# Patient Record
Sex: MALE | Race: WHITE | NOT HISPANIC OR LATINO
[De-identification: names, ages, dates, MRNs, and addresses within clinical notes are randomized per-mention and may not be internally consistent; named-entity substitution may affect disease eponyms.]

---

## 2018-11-21 PROBLEM — Z00.00 ENCOUNTER FOR PREVENTIVE HEALTH EXAMINATION: Status: ACTIVE | Noted: 2018-11-21

## 2018-12-21 ENCOUNTER — LABORATORY RESULT (OUTPATIENT)
Age: 48
End: 2018-12-21

## 2018-12-21 ENCOUNTER — APPOINTMENT (OUTPATIENT)
Dept: ENDOCRINOLOGY | Facility: CLINIC | Age: 48
End: 2018-12-21
Payer: COMMERCIAL

## 2018-12-21 VITALS
WEIGHT: 190 LBS | SYSTOLIC BLOOD PRESSURE: 120 MMHG | DIASTOLIC BLOOD PRESSURE: 82 MMHG | HEART RATE: 74 BPM | BODY MASS INDEX: 25.73 KG/M2 | HEIGHT: 72 IN

## 2018-12-21 DIAGNOSIS — Z78.9 OTHER SPECIFIED HEALTH STATUS: ICD-10-CM

## 2018-12-21 DIAGNOSIS — Z86.39 PERSONAL HISTORY OF OTHER ENDOCRINE, NUTRITIONAL AND METABOLIC DISEASE: ICD-10-CM

## 2018-12-21 DIAGNOSIS — R74.0 NONSPECIFIC ELEVATION OF LEVELS OF TRANSAMINASE AND LACTIC ACID DEHYDROGENASE [LDH]: ICD-10-CM

## 2018-12-21 PROCEDURE — 99213 OFFICE O/P EST LOW 20 MIN: CPT | Mod: 25

## 2018-12-21 PROCEDURE — 36415 COLL VENOUS BLD VENIPUNCTURE: CPT

## 2018-12-23 LAB
ANION GAP SERPL CALC-SCNC: 12 MMOL/L
BUN SERPL-MCNC: 15 MG/DL
CALCIUM SERPL-MCNC: 9.6 MG/DL
CHLORIDE SERPL-SCNC: 105 MMOL/L
CO2 SERPL-SCNC: 21 MMOL/L
CREAT SERPL-MCNC: 1.09 MG/DL
GLUCOSE SERPL-MCNC: 91 MG/DL
POTASSIUM SERPL-SCNC: 4.2 MMOL/L
SODIUM SERPL-SCNC: 138 MMOL/L
T3 SERPL-MCNC: 132 NG/DL
T3RU NFR SERPL: 0.99 INDEX
T4 SERPL-MCNC: 10.1 UG/DL
TSH SERPL-ACNC: 1.47 UIU/ML

## 2019-01-04 ENCOUNTER — TRANSCRIPTION ENCOUNTER (OUTPATIENT)
Age: 49
End: 2019-01-04

## 2019-03-08 ENCOUNTER — MEDICATION RENEWAL (OUTPATIENT)
Age: 49
End: 2019-03-08

## 2019-07-12 ENCOUNTER — APPOINTMENT (OUTPATIENT)
Dept: ENDOCRINOLOGY | Facility: CLINIC | Age: 49
End: 2019-07-12
Payer: COMMERCIAL

## 2019-07-12 VITALS
HEART RATE: 63 BPM | SYSTOLIC BLOOD PRESSURE: 120 MMHG | BODY MASS INDEX: 25.6 KG/M2 | DIASTOLIC BLOOD PRESSURE: 80 MMHG | HEIGHT: 72 IN | WEIGHT: 189 LBS

## 2019-07-12 PROCEDURE — 99213 OFFICE O/P EST LOW 20 MIN: CPT | Mod: 25

## 2019-07-12 PROCEDURE — 36415 COLL VENOUS BLD VENIPUNCTURE: CPT

## 2019-07-13 LAB
ANION GAP SERPL CALC-SCNC: 15 MMOL/L
BUN SERPL-MCNC: 20 MG/DL
CALCIUM SERPL-MCNC: 9.7 MG/DL
CHLORIDE SERPL-SCNC: 105 MMOL/L
CO2 SERPL-SCNC: 22 MMOL/L
CREAT SERPL-MCNC: 0.98 MG/DL
GLUCOSE SERPL-MCNC: 84 MG/DL
POTASSIUM SERPL-SCNC: 4.1 MMOL/L
SODIUM SERPL-SCNC: 142 MMOL/L
T3 SERPL-MCNC: 112 NG/DL
T4 SERPL-MCNC: 8.7 UG/DL
TSH SERPL-ACNC: 1.36 UIU/ML

## 2019-07-13 NOTE — PHYSICAL EXAM
[Alert] : alert [No Acute Distress] : no acute distress [Well Nourished] : well nourished [Well Developed] : well developed [PERRL] : pupils equal, round and reactive to light [Healthy Appearance] : healthy appearance [Normal Voice/Communication] : normal voice communication [No Proptosis] : no proptosis [Thyroid Not Enlarged] : the thyroid was not enlarged [Normal Outer Ear/Nose] : the ears and nose were normal in appearance [No Respiratory Distress] : no respiratory distress [No Stigmata of Cushings Syndrome] : no stigmata of cushings syndrome [Normal Insight/Judgement] : insight and judgment were intact [Oriented x3] : oriented to person, place, and time [Normal Rate and Effort] : normal respiratory rhythm and effort [Normal Mood] : the mood was normal [Normal Affect] : the affect was normal

## 2019-07-13 NOTE — HISTORY OF PRESENT ILLNESS
[FreeTextEntry1] : July 12, 2019\par  PCP: Dr. Tenisha Madrigal [facility]\par  phone: [phone] Fax: [phone]\par  Eyes: Dr. Bass\par  Derm: Dr. Monteiro - hair loss patch under chin\par  ENT at [facility]: [facility] (did rhinoplasty)\par  .\par  CC: Hypothyroid s/p I-131 2009 for Graves' Dis\par  2013: u/s thyroid - atrophic gland\par  alopecia areata beard area\par \par He remains on levothyroxine 112 mcg x 7 1/2 tabs a week.\par He feels well.\par \par Impression:  Hypothyroid post I-131 for hyperthyroidism due to Graves' disease - well replaced.\par Plan:  Same Rx  - stagger visits here roughtly by 6 months to his PCP.  \par \par \par \par December 21, 2018\par \par  PCP: Dr. Tenisha Madrigal [facility]\par  phone: [phone] Fax: [phone]\par  Eyes: Dr. Bass\par  Derm: Dr. Monteiro - hair loss patch under chin\par  ENT at [facility]: [facility] (did rhinoplasty)\par  .\par  CC: Hypothyroid s/p I-131 2009 for Graves' Dis\par  2013: u/s thyroid - atrophic gland\par  alopecia areata beard area\par \par Currently taking levothyroxine 112 mcg x 7 1/2 tabs a week and feels well.\par \par Plan:   TFTs today and ROV 6 months.  \par \par \par \par \par Previous notes from [facility] appended below.\par \par \par  July 20, 2018\par            .\par            PCP: Dr. Tenisha Madrigal [facility]\par             phone: [phone] Fax: [phone]\par             Eyes: Dr. Bass\par             Derm: Dr. Monteiro - hair loss patch under chin\par             ENT at [facility]: [facility] (did rhinoplasty)\par            .\par            CC: Hypothyroid s/p I-131 2009 for Graves' Dis\par             2013: u/s thyroid - atrophic gland\par             alopecia areata beard area\par            .\par            Remains on 7 1/2 tabs of LT4 112 mcg daily. \par            .\par            Has had some fluctuations of TSH.\par            January 12 at [facility] 7.38 and\par            Jan 22 at [facility] TSH 3.45\par            .\par            Subsequent GARCIA.\par            Has followed up to Dr. 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 \par            Does not appear that TFTs are related to HA. \par            .\par            Plan: Updated labs today and ROV end of November.\par            .\par            ==\par            .\par            January 22, 2018\par            .\par            .\par            PCP: Dr. Tenisha Madrigal [facility]\par             phone: [phone] Fax: [phone]\par             Eyes: Dr. Bass\par             Derm: Dr. Monteiro - hair loss patch under chin\par            .\par            CC: Hypothyroid s/p I-131 2009 for Graves' Dis\par             2013: u/s thyroid - atrophic gland\par            .\par            Recent labs kindly provided by Dr. Tenisha Mane from \par            1/12/2018 included\par            TSH 7.88\par            .\par            Notes 2 BM/d\par            1 + urine \par            .\par            Imp: Taking L-T4 112 x 7 1/2 tabs a week = 119\par            .\par            Plan: Updated labs\par            May need 125\par            ROV 4 months\par            .\par            ==\par            .\par            June 16, 2017\par            .\par            PCP: Dr. Tenisha Madrigal [facility]\par             phone: [phone] Fax: [phone]\par             Eyes: Dr. Bass\par             Derm: Dr. Monteiro - hair loss patch under chin\par            .\par            CC: Hypothyroid s/p I-131 2009\par             2013: u/s thyroid - atrophic gland\par            .\par            Taking levothyroxine 112 mcg x 7 1/2 tabs a week. (~120 mcg avg)\par            Feels well.\par            No symptoms\par            .\par            Impression: S/P I131 treatment of hyperthyroidism in 2009, doing well on current dose of levothyroxine \par            .\par            Plan: History reviewed with Mr. Raya.\par            Guidelines reviewed. \par            Updated labs today and ROV March 2018.\par            Call here in 10-14 days.\par            .\par            ==\par            .\par            May 6, 2016\par            .\par            PCP: Dr. Leatha Fischer -> Dr. Tenisha Mane - /[facility]\par             phone: [phone] Fax: [phone]\par             Eyes: Dr. Bass\par            .\par            CC: Hypothyroid s/p I-131 2009\par            .\par            Note from May 2015 appended below.\par            At that time, labs ([facility]) included:\par            .\par            TSH 0.41,  (<140%) on\par            levothyroxine 112 mcg x 7 1/2 pills a week. \par            .\par            1/2009: Hematuria - Dr. Eisenberg - negative U/S.\par            .\par            .\par            11/9/2009: ultrasound abdomen: "No suspicious finding to account for elevated enzymes..." as read by Dr. Long\par            .\par            11/18/2009: I-131 18 mCi to treat hyperthyroidism. after he developed\par            LFT elevation on methimazole.\par            .\par            2/10/2012: Seen at [facility] for chest pain. TSH 3.94\par            .\par            4/12/2013: Ultrasound thyroid: Heterogeneous atrophic thyroid..R lobe 2.8 cm, L lobe 1.9 cm (Dr. Almanza). \par            .\par            5/3/2016 ([facility] via [facility]) included:\par            TSH 1.65 (on levothyroxine 112 mcg x 7 1/2 pills/week)\par            T4 10.8\par            T3 102\par            .\par            Impression: Hypothyroidism due to I131 treatment of Graves' disease after LFT elevation on methimazole currently well controlled on levothyroxine 112 mcg x 7 1/2 pills a week. He feels well. Notes no symptoms. Eyes being followed by Dr. Bass.\par            .\par            Plan: Same Rx. ROV within one year, sooner if needed. \par            .\par            ==\par            .\par            May 15, 2015\par            .\par            PCP: Dr. Leatha Fischer\par            CC: Hypothyroid s/p I-131 2009\par            .\par            Note from last year appended below.\par            Recent TSH within normal limits on levothyroxine 112 mcg\par            7 1/2 pills/week.\par            .\par            Impression: Doing well.\par            .\par            Plan: F/U one year.\par            .\par            ==\par            April 25, 2014\par            PCP: Dr. Leatha Fischer\par            CC: Hypothyroid s/p I-131\par            .\par            2009: I-131\par            2014 TSH normal on L-thyroxine 112 7 1/2 pills a week \par            Impression: Feels well. Previous sonogram thyroid bed unremarkable. TFTs in good range.\par            .\par            Plan: Same Rx.\par            F/U Dr. Fischer\par            .\par            =======\par            October 18, 2013\par            April 19, 2013\par            CC: Hypothryoid s/p I-131 for Graves dis\par            .\par            PCP: Dr. Fischer\par            .\par            Feels well on L-thyroxine 112 mcg daily.\par            TSH still slgihtly elevated, as is TSI\par            Thyroid sonogram shows only atrophic gland.\par            .\par            Will continue same Rx for now, possibly raise dose after next visit in 6 months.

## 2020-03-06 ENCOUNTER — APPOINTMENT (OUTPATIENT)
Dept: ENDOCRINOLOGY | Facility: CLINIC | Age: 50
End: 2020-03-06
Payer: COMMERCIAL

## 2020-03-06 VITALS
HEIGHT: 72 IN | HEART RATE: 67 BPM | SYSTOLIC BLOOD PRESSURE: 122 MMHG | WEIGHT: 187 LBS | BODY MASS INDEX: 25.33 KG/M2 | OXYGEN SATURATION: 98 % | DIASTOLIC BLOOD PRESSURE: 80 MMHG

## 2020-03-06 PROCEDURE — 99214 OFFICE O/P EST MOD 30 MIN: CPT | Mod: 25

## 2020-03-06 PROCEDURE — 36415 COLL VENOUS BLD VENIPUNCTURE: CPT

## 2020-03-07 LAB
T3 SERPL-MCNC: 119 NG/DL
T4 SERPL-MCNC: 9.3 UG/DL
TSH SERPL-ACNC: 2.49 UIU/ML

## 2020-03-07 NOTE — HISTORY OF PRESENT ILLNESS
[FreeTextEntry1] : Mar 06, 2020\par \par PCP: Dr. Tenisha Madrigal [facility]  phone: [phone] Fax: [phone]\par  Eyes: Dr. Bsas\par  Derm: Dr. Monteiro - hair loss patch under chin\par  ENT at [facility]: [facility] (did rhinoplasty)\par  .\par  CC: Hypothyroid s/p I-131 2009 for Graves' Dis\par  2013: u/s thyroid - atrophic gland\par  alopecia areata beard area\par \par 48 yo still taking levothyroxine 112 mcg x 7 1/2 tabs a week for hypothyroidism post I-131 treatment in 2009.\par He feels well.\par Plan:  Updated TFTs today.\par ROV one year.  \par \par July 12, 2019\par  PCP: Dr. Tenisha Madrigal [facility]\par  phone: [phone] Fax: [phone]\par  Eyes: Dr. Bass\par  Derm: Dr. Monteiro - hair loss patch under chin\par  ENT at [facility]: [facility] (did rhinoplasty)\par  .\par  CC: Hypothyroid s/p I-131 2009 for Graves' Dis\par  2013: u/s thyroid - atrophic gland\par  alopecia areata beard area\par \par He remains on levothyroxine 112 mcg x 7 1/2 tabs a week.\par He feels well.\par \par Impression:  Hypothyroid post I-131 for hyperthyroidism due to Graves' disease - well replaced.\par Plan:  Same Rx  - stagger visits here roughtly by 6 months to his PCP.  \par \par \par \par December 21, 2018\par \par  PCP: Dr. Tenisha Madrigal [facility]\par  phone: [phone] Fax: [phone]\par  Eyes: Dr. Bass\par  Derm: Dr. Monteiro - hair loss patch under chin\par  ENT at [facility]: [facility] (did rhinoplasty)\par  .\par  CC: Hypothyroid s/p I-131 2009 for Graves' Dis\par  2013: u/s thyroid - atrophic gland\par  alopecia areata beard area\par \par Currently taking levothyroxine 112 mcg x 7 1/2 tabs a week and feels well.\par \par Plan:   TFTs today and ROV 6 months.  \par \par \par \par \par Previous notes from [facility] [facility] appended below.\par \par \par  July 20, 2018\par            .\par            PCP: Dr. Tenisha Mane - [facility]\par             phone: [phone] Fax: [phone]\par             Eyes: Dr. Bass\par             Derm: Dr. Monteiro - hair loss patch under chin\par             [facility] at [facility] (did [facility]in[facility])\par            .\par            CC: Hypothyroid s/p I-131 2009 for Graves' Dis\par             2013: u/s thyroid - atrophic gland\par             alopecia areata beard area\par            .\par            Remains on 7 1/2 tabs of LT4 112 mcg daily. \par            .\par            Has had some fluctuations of TSH.\par            January 12 at [facility] 7.38 and\par            Jan 22 at [facility] TSH 3.45\par            .\par            Subsequent GARCIA.\par            Has followed up to Dr. 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 \par            Does not appear that TFTs are related to HA. \par            .\par            Plan: Updated labs today and ROV end of November.\par            .\par            ==\par            .\par            January 22, 2018\par            .\par            .\par            PCP: Dr. Tenisha Mane - [facility]\par             phone: [phone] Fax: [phone]\par             Eyes: Dr. Bass\par             Derm: Dr. Monteiro - hair loss patch under chin\par            .\par            CC: Hypothyroid s/p I-131 2009 for Graves' Dis\par             2013: u/s thyroid - atrophic gland\par            .\par            Recent labs kindly provided by Dr. Tenisha Mane from \par            1/12/2018 included\par            TSH 7.88\par            .\par            Notes 2 BM/d\par            1 + urine \par            .\par            Imp: Taking L-T4 112 x 7 1/2 tabs a week = 119\par            .\par            Plan: Updated labs\par            May need 125\par            ROV 4 months\par            .\par            ==\par            .\par            June 16, 2017\par            .\par            PCP: Dr. Tenisha Mane - [facility]\par             phone: [phone] Fax: [phone]\par             Eyes: Dr. Bass\par             Derm: Dr. Monteiro - hair loss patch under chin\par            .\par            CC: Hypothyroid s/p I-131 2009\par             2013: u/s thyroid - atrophic gland\par            .\par            Taking levothyroxine 112 mcg x 7 1/2 tabs a week. (~120 mcg avg)\par            Feels well.\par            No symptoms\par            .\par            Impression: S/P I131 treatment of hyperthyroidism in 2009, doing well on current dose of levothyroxine \par            .\par            Plan: History reviewed with Mr. Raya.\par            Guidelines reviewed. \par            Updated labs today and ROV March 2018.\par            Call here in 10-14 days.\par            .\par            ==\par            .\par            May 6, 2016\par            .\par            PCP: Dr. Leatha Fischer -> Dr. Tenisha Mane - /[facility]\par             phone: [phone] Fax: [phone]\par             Eyes: Dr. Bass\par            .\par            CC: Hypothyroid s/p I-131 2009\par            .\par            Note from May 2015 appended below.\par            At that time, labs ([facility]) included:\par            .\par            TSH 0.41,  (<140%) on\par            levothyroxine 112 mcg x 7 1/2 pills a week. \par            .\par            1/2009: Hematuria - Dr. Eisenberg - negative U/S.\par            .\par            .\par            11/9/2009: ultrasound abdomen: "No suspicious finding to account for elevated enzymes..." as read by Dr. Long\par            .\par            11/18/2009: I-131 18 mCi to treat hyperthyroidism. after he developed\par            LFT elevation on methimazole.\par            .\par            2/10/2012: Seen at [place] ED for chest pain. TSH 3.94\par            .\par            4/12/2013: Ultrasound thyroid: Heterogeneous atrophic thyroid..R lobe 2.8 cm, L lobe 1.9 cm (Dr. Almanza). \par            .\par            5/3/2016 ([facility] via [facility]) included:\par            TSH 1.65 (on levothyroxine 112 mcg x 7 1/2 pills/week)\par            T4 10.8\par            T3 102\par            .\par            Impression: Hypothyroidism due to I131 treatment of Graves' disease after LFT elevation on methimazole currently well controlled on levothyroxine 112 mcg x 7 1/2 pills a week. He feels well. Notes no symptoms. Eyes being followed by Dr. Bass.\par            .\par            Plan: Same Rx. ROV within one year, sooner if needed. \par            .\par            ==\par            .\par            May 15, 2015\par            .\par            PCP: Dr. Leatha Fischer\par            CC: Hypothyroid s/p I-131 2009\par            .\par            Note from last year appended below.\par            Recent TSH within normal limits on levothyroxine 112 mcg\par            7 1/2 pills/week.\par            .\par            Impression: Doing well.\par            .\par            Plan: F/U one year.\par            .\par            ==\par            April 25, 2014\par            PCP: Dr. Leatha Fischer\par            CC: Hypothyroid s/p I-131\par            .\par            2009: I-131\par            2014 TSH normal on L-thyroxine 112 7 1/2 pills a week \par            Impression: Feels well. Previous sonogram thyroid bed unremarkable. TFTs in good range.\par            .\par            Plan: Same Rx.\par            F/U Dr. Fischer\par            .\par            =======\par            October 18, 2013\par            April 19, 2013\par            CC: Hypothryoid s/p I-131 for Graves dis\par            .\par            PCP: Dr. Fischer\par            .\par            Feels well on L-thyroxine 112 mcg daily.\par            TSH still slgihtly elevated, as is TSI\par            Thyroid sonogram shows only atrophic gland.\par            .\par            Will continue same Rx for now, possibly raise dose after next visit in 6 months.

## 2020-03-07 NOTE — PHYSICAL EXAM
[Alert] : alert [No Acute Distress] : no acute distress [Well Nourished] : well nourished [Well Developed] : well developed [Healthy Appearance] : healthy appearance [Normal Voice/Communication] : normal voice communication [No Proptosis] : no proptosis [No Respiratory Distress] : no respiratory distress [Normal Rate and Effort] : normal respiratory rhythm and effort [No Accessory Muscle Use] : no accessory muscle use [Normal Rate] : heart rate was normal  [No Involuntary Movements] : no involuntary movements were seen [No Tremors] : no tremors [Oriented x3] : oriented to person, place, and time [Normal Insight/Judgement] : insight and judgment were intact [Normal Affect] : the affect was normal [Normal Mood] : the mood was normal [de-identified] : Corona Virus season

## 2021-02-05 ENCOUNTER — APPOINTMENT (OUTPATIENT)
Dept: ENDOCRINOLOGY | Facility: CLINIC | Age: 51
End: 2021-02-05
Payer: COMMERCIAL

## 2021-02-05 PROCEDURE — 99213 OFFICE O/P EST LOW 20 MIN: CPT | Mod: 95

## 2021-02-05 NOTE — HISTORY OF PRESENT ILLNESS
[FreeTextEntry1] : Feb 05, 2021    iPhone \par \par PCP: Dr. Tenisha Madrigal [facility]  phone: [phone] Fax: [phone]\par  Eyes: Dr. Bass\par  Derm: Dr. Monteiro - hair loss patch under chin\par  ENT at [facility]: [facility] (did rhinoplasty)\par  .\par  CC: Hypothyroid s/p I-131 2009 for Graves' Dis\par  2013: u/s thyroid - atrophic gland\par  alopecia areata beard area\par  hep\par \par Hypothyroid post I-131 in 2009 on levothryoxine 112 x 7 1/2 tabs a week.\par Feels well.\par \par Plan:  TFTs at [place].  Call me a few days later.\par ROV in 10-11 months\par \par \par \par \par \par Mar 06, 2020\par \par PCP: Dr. Tenisha Madrigal [facility]  phone: [phone] Fax: [phone]\par  Eyes: Dr. Bass\par  Derm: Dr. Monteiro - hair loss patch under chin\par  ENT at [facility]: [facility] (did rhinoplasty)\par  .\par  CC: Hypothyroid s/p I-131 2009 for Graves' Dis\par  2013: u/s thyroid - atrophic gland\par  alopecia areata beard area\par \par 48 yo still taking levothyroxine 112 mcg x 7 1/2 tabs a week for hypothyroidism post I-131 treatment in 2009.\par He feels well.\par Plan:  Updated TFTs today.\par ROV one year.  \par \par July 12, 2019\par  PCP: Dr. Tenisha Madrigal [facility]\par  phone: [phone] Fax: [phone]\par  Eyes: Dr. Bass\par  Derm: Dr. Monteiro - hair loss patch under chin\par  ENT at [facility]: [facility] (did rhinoplasty)\par  .\par  CC: Hypothyroid s/p I-131 2009 for Graves' Dis\par  2013: u/s thyroid - atrophic gland\par  alopecia areata beard area\par \par He remains on levothyroxine 112 mcg x 7 1/2 tabs a week.\par He feels well.\par \par Impression:  Hypothyroid post I-131 for hyperthyroidism due to Graves' disease - well replaced.\par Plan:  Same Rx  - stagger visits here roughtly by 6 months to his PCP.  \par \par \par \par December 21, 2018\par \par  PCP: Dr. Tenisha Madrigal [facility]\par  phone: [phone] Fax: [phone]\par  Eyes: Dr. Bass\par  Derm: Dr. Monteiro - hair loss patch under chin\par  ENT at [facility]: [facility] (did rhinoplasty)\par  .\par  CC: Hypothyroid s/p I-131 2009 for Graves' Dis\par  2013: u/s thyroid - atrophic gland\par  alopecia areata beard area\par \par Currently taking levothyroxine 112 mcg x 7 1/2 tabs a week and feels well.\par \par Plan:   TFTs today and ROV 6 months.  \par \par \par \par \par Previous notes from [employer] appended below.\par \par \par  July 20, 2018\par            .\par            PCP: Dr. Tenisha Madrigal [facility]\par             phone: [phone] Fax: [phone]\par             Eyes: Dr. Bass\par             Derm: Dr. Monteiro - hair loss patch under chin\par             ENT at [facility]: [facility] (did rhinoplasty)\par            .\par            CC: Hypothyroid s/p I-131 2009 for Graves' Dis\par             2013: u/s thyroid - atrophic gland\par             alopecia areata beard area\par            .\par            Remains on 7 1/2 tabs of LT4 112 mcg daily. \par            .\par            Has had some fluctuations of TSH.\par            January 12 at [facility] 7.38 and\par            Jan 22 at [facility] TSH 3.45\par            .\par            Subsequent GARCIA.\par            Has followed up to Dr. 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 \par            Does not appear that TFTs are related to HA. \par            .\par            Plan: Updated labs today and ROV end of November.\par            .\par            ==\par            .\par            January 22, 2018\par            .\par            .\par            PCP: Dr. Tenisha Madrigal [facility]\par             phone: [phone] Fax: [phone]\par             Eyes: Dr. Bass\par             Derm: Dr. Monteiro - hair loss patch under chin\par            .\par            CC: Hypothyroid s/p I-131 2009 for Graves' Dis\par             2013: u/s thyroid - atrophic gland\par            .\par            Recent labs kindly provided by Dr. Tenisha Mane from \par            1/12/2018 included\par            TSH 7.88\par            .\par            Notes 2 BM/d\par            1 + urine \par            .\par            Imp: Taking L-T4 112 x 7 1/2 tabs a week = 119\par            .\par            Plan: Updated labs\par            May need 125\par            ROV 4 months\par            .\par            ==\par            .\par            June 16, 2017\par            .\par            PCP: Dr. Tenisha Madrigal [facility]\par             phone: [phone] Fax: [phone]\par             Eyes: Dr. Bass\par             Derm: Dr. Thania Madrigal hair loss patch under chin\par            .\par            CC: Hypothyroid s/p I-131 2009\par             2013: u/s thyroid - atrophic gland\par            .\par            Taking levothyroxine 112 mcg x 7 1/2 tabs a week. (~120 mcg avg)\par            Feels well.\par            No symptoms\par            .\par            Impression: S/P I131 treatment of hyperthyroidism in 2009, doing well on current dose of levothyroxine \par            .\par            Plan: History reviewed with Mr. Raya.\par            Guidelines reviewed. \par            Updated labs today and ROV March 2018.\par            Call here in 10-14 days.\par            .\par            ==\par            .\par            May 6, 2016\par            .\par            PCP: Dr. Leatha Fischer -> Dr. Tenisha Mane - [facility]\par             phone: [phone] Fax: [phone]\par             Eyes: Dr. Bass\par            .\par            CC: Hypothyroid s/p I-131 2009\par            .\par            Note from May 2015 appended below.\par            At that time, labs ([facility]) included:\par            .\par            TSH 0.41,  (<140%) on\par            levothyroxine 112 mcg x 7 1/2 pills a week. \par            .\par            1/2009: Hematuria - Dr. Eisenberg - negative U/S.\par            .\par            .\par            11/9/2009: ultrasound abdomen: "No suspicious finding to account for elevated enzymes..." as read by Dr. Long\par            .\par            11/18/2009: I-131 18 mCi to treat hyperthyroidism. after he developed\par            LFT elevation on methimazole.\par            .\par            2/10/2012: Seen at [facility] [place] ED for chest pain. TSH 3.94\par            .\par            4/12/2013: Ultrasound thyroid: Heterogeneous atrophic thyroid..R lobe 2.8 cm, L lobe 1.9 cm (Dr. Almanza). \par            .\par            5/3/2016 ([facility] via [facility]) included:\par            TSH 1.65 (on levothyroxine 112 mcg x 7 1/2 pills/week)\par            T4 10.8\par            T3 102\par            .\par            Impression: Hypothyroidism due to I131 treatment of Graves' disease after LFT elevation on methimazole currently well controlled on levothyroxine 112 mcg x 7 1/2 pills a week. He feels well. Notes no symptoms. Eyes being followed by Dr. Bass.\par            .\par            Plan: Same Rx. ROV within one year, sooner if needed. \par            .\par            ==\par            .\par            May 15, 2015\par            .\par            PCP: Dr. Leatha Fischer\par            CC: Hypothyroid s/p I-131 2009\par            .\par            Note from last year appended below.\par            Recent TSH within normal limits on levothyroxine 112 mcg\par            7 1/2 pills/week.\par            .\par            Impression: Doing well.\par            .\par            Plan: F/U one year.\par            .\par            ==\par            April 25, 2014\par            PCP: Dr. Leatha Fischer\par            CC: Hypothyroid s/p I-131\par            .\par            2009: I-131\par            2014 TSH normal on L-thyroxine 112 7 1/2 pills a week \par            Impression: Feels well. Previous sonogram thyroid bed unremarkable. TFTs in good range.\par            .\par            Plan: Same Rx.\par            F/U Dr. Fischer\par            .\par            =======\par            October 18, 2013\par            April 19, 2013\par            CC: Hypothryoid s/p I-131 for Graves dis\par            .\par            PCP: Dr. Fischer\par            .\par            Feels well on L-thyroxine 112 mcg daily.\par            TSH still slgihtly elevated, as is TSI\par            Thyroid sonogram shows only atrophic gland.\par            .\par            Will continue same Rx for now, possibly raise dose after next visit in 6 months.

## 2022-02-21 DIAGNOSIS — R53.83 OTHER FATIGUE: ICD-10-CM

## 2022-02-21 DIAGNOSIS — E78.00 PURE HYPERCHOLESTEROLEMIA, UNSPECIFIED: ICD-10-CM

## 2022-04-29 ENCOUNTER — NON-APPOINTMENT (OUTPATIENT)
Age: 52
End: 2022-04-29

## 2022-04-29 ENCOUNTER — APPOINTMENT (OUTPATIENT)
Dept: ENDOCRINOLOGY | Facility: CLINIC | Age: 52
End: 2022-04-29
Payer: COMMERCIAL

## 2022-04-29 DIAGNOSIS — E53.8 DEFICIENCY OF OTHER SPECIFIED B GROUP VITAMINS: ICD-10-CM

## 2022-04-29 PROCEDURE — 99214 OFFICE O/P EST MOD 30 MIN: CPT | Mod: 25

## 2022-04-29 PROCEDURE — 36415 COLL VENOUS BLD VENIPUNCTURE: CPT

## 2022-04-29 NOTE — HISTORY OF PRESENT ILLNESS
[FreeTextEntry1] : Apr 29, 2022                               asymtomatic Covid 19 infection \par \par PCP: Dr. Tenisha Madrigal [facility]  phone: [phone] Fax: [phone]\par  Eyes: Dr. Bass     in a few weeks  \par  Derm: Dr. Monteiro - hair loss patch under chin\par  ENT at [facility] (did rh[facility]oplasty)\par  .\par  CC: Hypothyroid s/p I-131 2009 for Graves' Dis\par  2013: u/s thyroid - atrophic gland\par  alopecia areata beard area - also on scalp, treated by Dr. Monteiro  \par  hep\par \par Hypothyroid post I-131 in 2009 on levothryoxine 112 x 7 1/2 tabs a week.\par Feels well.\par \par 50 yo visits for hypothyroidism post I-131 for hyperthyroidism due to Grave's disease in 2009\par \par : :\par Constitutional:  Alert, well nourished, healthy appearance, no acute distress \par Eyes:  No proptosis, no stare\par Thyroid: minimal tissue\par Pulmonary:  No respiratory distress, no accessory muscle use; normal rate and effort\par Cardiac:  Normal rate\par Vascular: \par Endocrine:  No stigmata of Cushing’s Syndrome\par Musculoskeletal:  Normal gait, no involuntary movements\par Neurology:  No tremors\par Affect/Mood/Psych:  Oriented x 3; normal affect, normal insight/judgement, normal mood \par .\par \par Impression:  Post I-131 hypoothyroidism has been well controleld on levothyroxine 112 x 7 1/2 tabs a week.\par \par Plan:  He will follow up to the other members of his team.\par \par TFTs today and ROV in 8 months.  \par \par \par \par Feb 05, 2021    iPhone \par \par PCP: Dr. Tenisha Madrigal /[place]  phone: [phone] Fax: [phone]\par  Eyes: Dr. Bass\par  Derm: Dr. Monteiro - hair loss patch under chin\par  ENT at [facility]: [facility] (did rhinoplasty)\par  .\par  CC: Hypothyroid s/p I-131 2009 for Graves' Dis\par  2013: u/s thyroid - atrophic gland\par  alopecia areata beard area\par  hep\par \par Hypothyroid post I-131 in 2009 on levothryoxine 112 x 7 1/2 tabs a week.\par Feels well.\par \par Plan:  TFTs at [place].  Call me a few days later.\par ROV in 10-11 months\par \par \par \par \par \par Mar 06, 2020\par \par PCP: Dr. Tenisha Mane - /[place]  phone: [phone] Fax: [phone]\par  Eyes: Dr. Bass\par  Derm: Dr. Monteiro - hair loss patch under chin\par  ENT at [facility]: [facility] (did rhinoplasty)\par  .\par  CC: Hypothyroid s/p I-131 2009 for Graves' Dis\par  2013: u/s thyroid - atrophic gland\par  alopecia areata beard area\par \par 50 yo still taking levothyroxine 112 mcg x 7 1/2 tabs a week for hypothyroidism post I-131 treatment in 2009.\par He feels well.\par Plan:  Updated TFTs today.\par ROV one year.  \par \par July 12, 2019\par  PCP: Dr. Tenisha Mane - /[place]\par  phone: [phone] Fax: [phone]\par  Eyes: Dr. Bass\par  Derm: Dr. Monteiro - hair loss patch under chin\par  ENT at [facility]: [facility] (did rhinoplasty)\par  .\par  CC: Hypothyroid s/p I-131 2009 for Graves' Dis\par  2013: u/s thyroid - atrophic gland\par  alopecia areata beard area\par \par He remains on levothyroxine 112 mcg x 7 1/2 tabs a week.\par He feels well.\par \par Impression:  Hypothyroid post I-131 for hyperthyroidism due to Graves' disease - well replaced.\par Plan:  Same Rx  - stagger visits here roughtly by 6 months to his PCP.  \par \par \par \par December 21, 2018\par \par  PCP: Dr. Tenisha Madrigal [facility]\par  phone: [phone] Fax: [phone]\par  Eyes: Dr. Bass\par  Derm: Dr. Monteiro - hair loss patch under chin\par  ENT at [facility] (did rhinoplasty)\par  .\par  CC: Hypothyroid s/p I-131 2009 for Graves' Dis\par  2013: u/s thyroid - atrophic gland\par  alopecia areata beard area\par \par Currently taking levothyroxine 112 mcg x 7 1/2 tabs a week and feels well.\par \par Plan:   TFTs today and ROV 6 months.  \par \par \par \par \par Previous notes from [employer] appended below.\par \par \par  July 20, 2018\par            .\par            PCP: Dr. Tenisha Madrgial [facility]\par             phone: [phone] Fax: [phone]\par             Eyes: Dr. Bass\par             Derm: Dr. Monteiro - hair loss patch under chin\par             ENT at [facility]: [facility] (did rhinoplasty)\par            .\par            CC: Hypothyroid s/p I-131 2009 for Graves' Dis\par             2013: u/s thyroid - atrophic gland\par             alopecia areata beard area\par            .\par            Remains on 7 1/2 tabs of LT4 112 mcg daily. \par            .\par            Has had some fluctuations of TSH.\par            January 12 at [facility] 7.38 and\par            Jan 22 at [facility] TSH 3.45\par            .\par            Subsequent GARCIA.\par            Has followed up to Dr. 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 \par            Does not appear that TFTs are related to HA. \par            .\par            Plan: Updated labs today and ROV end of November.\par            .\par            ==\par            .\par            January 22, 2018\par            .\par            .\par            PCP: Dr. Tenisha Madrigal [facility]\par             phone: [phone] Fax: [phone]\par             Eyes: Dr. Bass\par             Derm: Dr. Monteiro - hair loss patch under chin\par            .\par            CC: Hypothyroid s/p I-131 2009 for Graves' Dis\par             2013: u/s thyroid - atrophic gland\par            .\par            Recent labs kindly provided by Dr. Tenisha Mane from \par            1/12/2018 included\par            TSH 7.88\par            .\par            Notes 2 BM/d\par            1 + urine \par            .\par            Imp: Taking L-T4 112 x 7 1/2 tabs a week = 119\par            .\par            Plan: Updated labs\par            May need 125\par            ROV 4 months\par            .\par            ==\par            .\par            June 16, 2017\par            .\par            PCP: Dr. Tenisha Madrigal [facility]\par             phone: [phone] Fax: [phone]\par             Eyes: Dr. Bass\par             Derm: Dr. Monteiro - hair loss patch under chin\par            .\par            CC: Hypothyroid s/p I-131 2009\par             2013: u/s thyroid - atrophic gland\par            .\par            Taking levothyroxine 112 mcg x 7 1/2 tabs a week. (~120 mcg avg)\par            Feels well.\par            No symptoms\par            .\par            Impression: S/P I131 treatment of hyperthyroidism in 2009, doing well on current dose of levothyroxine \par            .\par            Plan: History reviewed with Mr. Riverahty.\par            Guidelines reviewed. \par            Updated labs today and ROV March 2018.\par            Call here in 10-14 days.\par            .\par            ==\par            .\par            May 6, 2016\par            .\par            PCP: Dr. Leatha Fischer -> Dr. Tenisha Madrigal [facility]\par             phone: [phone] Fax: [phone]\par             Eyes: Dr. Bass\par            .\par            CC: Hypothyroid s/p I-131 2009\par            .\par            Note from May 2015 appended below.\par            At that time, labs ([facility]) included:\par            .\par            TSH 0.41,  (<140%) on\par            levothyroxine 112 mcg x 7 1/2 pills a week. \par            .\par            1/2009: Hematuria - Dr. Eisenberg - negative U/S.\par            .\par            .\par            11/9/2009: ultrasound abdomen: "No suspicious finding to account for elevated enzymes..." as read by Dr. Long\par            .\par            11/18/2009: I-131 18 mCi to treat hyperthyroidism. after he developed\par            LFT elevation on methimazole.\par            .\par            2/10/2012: Seen at [facility] ED for chest pain. TSH 3.94\par            .\par            4/12/2013: Ultrasound thyroid: Heterogeneous atrophic thyroid..R lobe 2.8 cm, L lobe 1.9 cm (Dr. Almanza). \par            .\par            5/3/2016 ([facility] via [facility]) included:\par            TSH 1.65 (on levothyroxine 112 mcg x 7 1/2 pills/week)\par            T4 10.8\par            T3 102\par            .\par            Impression: Hypothyroidism due to I131 treatment of Graves' disease after LFT elevation on methimazole currently well controlled on levothyroxine 112 mcg x 7 1/2 pills a week. He feels well. Notes no symptoms. Eyes being followed by Dr. Bass.\par            .\par            Plan: Same Rx. ROV within one year, sooner if needed. \par            .\par            ==\par            .\par            May 15, 2015\par            .\par            PCP: Dr. Leatha Fischer\par            CC: Hypothyroid s/p I-131 2009\par            .\par            Note from last year appended below.\par            Recent TSH within normal limits on levothyroxine 112 mcg\par            7 1/2 pills/week.\par            .\par            Impression: Doing well.\par            .\par            Plan: F/U one year.\par            .\par            ==\par            April 25, 2014\par            PCP: Dr. Leatha Fischer\par            CC: Hypothyroid s/p I-131\par            .\par            2009: I-131\par            2014 TSH normal on L-thyroxine 112 7 1/2 pills a week \par            Impression: Feels well. Previous sonogram thyroid bed unremarkable. TFTs in good range.\par            .\par            Plan: Same Rx.\par            F/U Dr. Fischer\par            .\par            =======\par            October 18, 2013\par            April 19, 2013\par            CC: Hypothryoid s/p I-131 for Graves dis\par            .\par            PCP: Dr. Fischer\par            .\par            Feels well on L-thyroxine 112 mcg daily.\par            TSH still slgihtly elevated, as is TSI\par            Thyroid sonogram shows only atrophic gland.\par            .\par            Will continue same Rx for now, possibly raise dose after next visit in 6 months.

## 2022-05-16 LAB
ALBUMIN SERPL ELPH-MCNC: 4.7 G/DL
ALP BLD-CCNC: 55 U/L
ALT SERPL-CCNC: 17 U/L
ANION GAP SERPL CALC-SCNC: 13 MMOL/L
AST SERPL-CCNC: 16 U/L
BILIRUB DIRECT SERPL-MCNC: 0.1 MG/DL
BILIRUB INDIRECT SERPL-MCNC: 0.2 MG/DL
BILIRUB SERPL-MCNC: 0.3 MG/DL
BUN SERPL-MCNC: 21 MG/DL
CALCIUM SERPL-MCNC: 9.9 MG/DL
CHLORIDE SERPL-SCNC: 104 MMOL/L
CHOLEST SERPL-MCNC: 216 MG/DL
CO2 SERPL-SCNC: 26 MMOL/L
CREAT SERPL-MCNC: 1.01 MG/DL
EGFR: 91 ML/MIN/1.73M2
FOLATE SERPL-MCNC: 8.9 NG/ML
GLUCOSE SERPL-MCNC: 100 MG/DL
HDLC SERPL-MCNC: 68 MG/DL
LDLC SERPL CALC-MCNC: 125 MG/DL
NONHDLC SERPL-MCNC: 149 MG/DL
POTASSIUM SERPL-SCNC: 4.3 MMOL/L
PROT SERPL-MCNC: 6.9 G/DL
SODIUM SERPL-SCNC: 143 MMOL/L
T3 SERPL-MCNC: 96 NG/DL
T4 SERPL-MCNC: 8.9 UG/DL
TRIGL SERPL-MCNC: 119 MG/DL
TSH SERPL-ACNC: 3.78 UIU/ML
VIT B12 SERPL-MCNC: 438 PG/ML

## 2023-01-06 ENCOUNTER — APPOINTMENT (OUTPATIENT)
Dept: ENDOCRINOLOGY | Facility: CLINIC | Age: 53
End: 2023-01-06
Payer: COMMERCIAL

## 2023-01-06 VITALS
OXYGEN SATURATION: 100 % | SYSTOLIC BLOOD PRESSURE: 114 MMHG | HEART RATE: 69 BPM | DIASTOLIC BLOOD PRESSURE: 60 MMHG | BODY MASS INDEX: 24.53 KG/M2 | WEIGHT: 181.13 LBS | HEIGHT: 72 IN

## 2023-01-06 PROCEDURE — 36415 COLL VENOUS BLD VENIPUNCTURE: CPT

## 2023-01-06 PROCEDURE — 99214 OFFICE O/P EST MOD 30 MIN: CPT | Mod: 25

## 2023-01-06 PROCEDURE — 99072 ADDL SUPL MATRL&STAF TM PHE: CPT

## 2023-01-06 NOTE — HISTORY OF PRESENT ILLNESS
[FreeTextEntry1] : Jan 06, 2023    in person\par \par PCP: Dr. Tenisha Madrigal [facility]  phone: [phone] Fax: [phone]\par  Eyes: Dr. Bass     in a few weeks  \par  Derm: Dr. Monteiro - hair loss patch under chin\par  ENT at [facility]: [facility] (did rhinoplasty)\par  .\par  CC: Hypothyroid s/p I-131 2009 for Graves' Dis\par  2013: u/s thyroid - atrophic gland\par  alopecia areata beard area - also on scalp, treated by Dr. Monteiro  \par  hep\par \par Hypothyroid post I-131 in 2009 on levothryoxine 112 x 7 1/2 tabs a week.\par Feels well.\par \par : :\par Constitutional:  Alert, well nourished, healthy appearance, no acute distress \par Eyes:  No proptosis, no stare\par Thyroid:\par Pulmonary:  No respiratory distress, no accessory muscle use; normal rate and effort\par Cardiac:  Normal rate\par Vascular: \par Endocrine:  No stigmata of Cushing’s Syndrome\par Musculoskeletal:  Normal gait, no involuntary movements\par Neurology:  No tremors\par Affect/Mood/Psych:  Oriented x 3; normal affect, normal insight/judgement, normal mood \par .\par \par Impresion:  Hypothyroidism well controlled.\par \par Plan:  Same Rx.\par TFTs today\par f/u to PCP and other members of healthcare team.\par ROV November.  \par \par \par \par \par Apr 29, 2022                               asymtomatic Covid 19 infection \par \par PCP: Dr. Tenisha Madrigal [facility]  phone: [phone] Fax: [phone]\par  Eyes: Dr. Bass     in a few weeks  \par  Derm: Dr. Monteiro - hair loss patch under chin\par  ENT at [facility]: [facility] (did rhinoplasty)\par  .\par  CC: Hypothyroid s/p I-131 2009 for Graves' Dis\par  2013: u/s thyroid - atrophic gland\par  alopecia areata beard area - also on scalp, treated by Dr. Monteiro  \par  hep\par \par Hypothyroid post I-131 in 2009 on levothryoxine 112 x 7 1/2 tabs a week.\par Feels well.\par \par 52 yo visits for hypothyroidism post I-131 for hyperthyroidism due to Grave's disease in 2009\par \par : :\par Constitutional:  Alert, well nourished, healthy appearance, no acute distress \par Eyes:  No proptosis, no stare\par Thyroid: minimal tissue\par Pulmonary:  No respiratory distress, no accessory muscle use; normal rate and effort\par Cardiac:  Normal rate\par Vascular: \par Endocrine:  No stigmata of Cushing’s Syndrome\par Musculoskeletal:  Normal gait, no involuntary movements\par Neurology:  No tremors\par Affect/Mood/Psych:  Oriented x 3; normal affect, normal insight/judgement, normal mood \par .\par \par Impression:  Post I-131 hypoothyroidism has been well controleld on levothyroxine 112 x 7 1/2 tabs a week.\par \par Plan:  He will follow up to the other members of his team.\par \par TFTs today and ROV in 8 months.  \par \par \par \par Feb 05, 2021    iPhone \par \par PCP: Dr. Tenisha Mane - /[place]  phone: [phone] Fax: [phone]\par  Eyes: Dr. Bass\par  Derm: Dr. Monteiro - hair loss patch under chin\par  ENT at : [facility] (did rhinoplasty)\par  .\par  CC: Hypothyroid s/p I-131 2009 for Graves' Dis\par  2013: u/s thyroid - atrophic gland\par  alopecia areata beard area\par  hep\par \par Hypothyroid post I-131 in 2009 on levothryoxine 112 x 7 1/2 tabs a week.\par Feels well.\par \par Plan:  TFTs at [facility].  Call me a few days later.\par ROV in 10-11 months\par \par \par \par \par \par Mar 06, 2020\par \par PCP: Dr. Tenisha Madrigal [facility]  phone: [phone] Fax: [phone]\par  Eyes: Dr. Bass\par  Derm: Dr. Monteiro - hair loss patch under chin\par  [facility] ([facility])\par  .\par  CC: Hypothyroid s/p I-131 2009 for Graves' Dis\par  2013: u/s thyroid - atrophic gland\par  alopecia areata beard area\par \par 48 yo still taking levothyroxine 112 mcg x 7 1/2 tabs a week for hypothyroidism post I-131 treatment in 2009.\par He feels well.\par Plan:  Updated TFTs today.\par ROV one year.  \par \par July 12, 2019\par  PCP: Dr. Tenisha Madrigal [facility]\par  phone: [phone] Fax: [phone]\par  Eyes: Dr. Bass\par  Derm: Dr. Monteiro - hair loss patch under chin\par  ENT [facility] ([facility]oplasty)\par  .\par  CC: Hypothyroid s/p I-131 2009 for Graves' Dis\par  2013: u/s thyroid - atrophic gland\par  alopecia areata beard area\par \par He remains on levothyroxine 112 mcg x 7 1/2 tabs a week.\par He feels well.\par \par Impression:  Hypothyroid post I-131 for hyperthyroidism due to Graves' disease - well replaced.\par Plan:  Same Rx  - stagger visits here roughtly by 6 months to his PCP.  \par \par \par \par December 21, 2018\par \par  PCP: Dr. Tenisha Madrigal [facility]\par  phone: [phone] Fax: [phone]\par  Eyes: Dr. Bass\par  Derm: Dr. Monteiro - hair loss patch under chin\par  ENT at [facility] (did rhinoplasty)\par  .\par  CC: Hypothyroid s/p I-131 2009 for Graves' Dis\par  2013: u/s thyroid - atrophic gland\par  alopecia areata beard area\par \par Currently taking levothyroxine 112 mcg x 7 1/2 tabs a week and feels well.\par \par Plan:   TFTs today and ROV 6 months.  \par \par \par \par \par Previous notes from [employer] appended below.\par \par \par  July 20, 2018\par            .\par            PCP: Dr. Tenisha Mane - [place]/[facility]\par             phone: [phone] Fax: [phone]\par             Eyes: Dr. Bass\par             Derm: Dr. Monteiro - hair loss patch under chin\par             ENT at [facility]: [facility] (did rhinoplasty)\par            .\par            CC: Hypothyroid s/p I-131 2009 for Graves' Dis\par             2013: u/s thyroid - atrophic gland\par             alopecia areata beard area\par            .\par            Remains on 7 1/2 tabs of LT4 112 mcg daily. \par            .\par            Has had some fluctuations of TSH.\par            January 12 at [facility] 7.38 and\par            Jan 22 at [facility] TSH 3.45\par            .\par            Subsequent GARCIA.\par            Has followed up to Dr. 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 \par            Does not appear that TFTs are related to HA. \par            .\par            Plan: Updated labs today and ROV end of November.\par            .\par            ==\par            .\par            January 22, 2018\par            .\par            .\par            PCP: Dr. Tenisha Mane - [place]/[facility]\par             phone: [phone] Fax: [phone]\par             Eyes: Dr. Bass\par             Derm: Dr. Monteiro - hair loss patch under chin\par            .\par            CC: Hypothyroid s/p I-131 2009 for Graves' Dis\par             2013: u/s thyroid - atrophic gland\par            .\par            Recent labs kindly provided by Dr. Tenisha Mane from \par            1/12/2018 included\par            TSH 7.88\par            .\par            Notes 2 BM/d\par            1 + urine \par            .\par            Imp: Taking L-T4 112 x 7 1/2 tabs a week = 119\par            .\par            Plan: Updated labs\par            May need 125\par            ROV 4 months\par            .\par            ==\par            .\par            June 16, 2017\par            .\par            PCP: Dr. Tenisha Mane - /[facility]\par             phone: [phone] Fax: [phone]\par             Eyes: Dr. Bass\par             Derm: Dr. Monteiro - hair loss patch under chin\par            .\par            CC: Hypothyroid s/p I-131 2009\par             2013: u/s thyroid - atrophic gland\par            .\par            Taking levothyroxine 112 mcg x 7 1/2 tabs a week. (~120 mcg avg)\par            Feels well.\par            No symptoms\par            .\par            Impression: S/P I131 treatment of hyperthyroidism in 2009, doing well on current dose of levothyroxine \par            .\par            Plan: History reviewed with Mr. Raya.\par            Guidelines reviewed. \par            Updated labs today and ROV March 2018.\par            Call here in 10-14 days.\par            .\par            ==\par            .\par            May 6, 2016\par            .\par            PCP: Dr. Leatha Fischer -> Dr. Tenisha Mane - /[facility]\par             phone: [phone] Fax: [phone]\par             Eyes: Dr. Bass\par            .\par            CC: Hypothyroid s/p I-131 2009\par            .\par            Note from May 2015 appended below.\par            At that time, labs ([facility]) included:\par            .\par            TSH 0.41,  (<140%) on\par            levothyroxine 112 mcg x 7 1/2 pills a week. \par            .\par            1/2009: Hematuria - Dr. Eisenberg - negative U/S.\par            .\par            .\par            11/9/2009: ultrasound abdomen: "No suspicious finding to account for elevated enzymes..." as read by Dr. Long\par            .\par            11/18/2009: I-131 18 mCi to treat hyperthyroidism. after he developed\par            LFT elevation on methimazole.\par            .\par            2/10/2012: Seen at [facility] ED for chest pain. TSH 3.94\par            .\par            4/12/2013: Ultrasound thyroid: Heterogeneous atrophic thyroid..R lobe 2.8 cm, L lobe 1.9 cm (Dr. Almanza). \par            .\par            5/3/2016 ([facility] via employer[facility]) included:\par            TSH 1.65 (on levothyroxine 112 mcg x 7 1/2 pills/week)\par            T4 10.8\par            T3 102\par            .\par            Impression: Hypothyroidism due to I131 treatment of Graves' disease after LFT elevation on methimazole currently well controlled on levothyroxine 112 mcg x 7 1/2 pills a week. He feels well. Notes no symptoms. Eyes being followed by Dr. Bass.\par            .\par            Plan: Same Rx. ROV within one year, sooner if needed. \par            .\par            ==\par            .\par            May 15, 2015\par            .\par            PCP: Dr. Leatha Fischer\par            CC: Hypothyroid s/p I-131 2009\par            .\par            Note from last year appended below.\par            Recent TSH within normal limits on levothyroxine 112 mcg\par            7 1/2 pills/week.\par            .\par            Impression: Doing well.\par            .\par            Plan: F/U one year.\par            .\par            ==\par            April 25, 2014\par            PCP: Dr. Leatha Fischer\par            CC: Hypothyroid s/p I-131\par            .\par            2009: I-131\par            2014 TSH normal on L-thyroxine 112 7 1/2 pills a week \par            Impression: Feels well. Previous sonogram thyroid bed unremarkable. TFTs in good range.\par            .\par            Plan: Same Rx.\par            F/U Dr. Fischer\par            .\par            =======\par            October 18, 2013\par            April 19, 2013\par            CC: Hypothryoid s/p I-131 for Graves dis\par            .\par            PCP: Dr. Fischer\par            .\par            Feels well on L-thyroxine 112 mcg daily.\par            TSH still slgihtly elevated, as is TSI\par            Thyroid sonogram shows only atrophic gland.\par            .\par            Will continue same Rx for now, possibly raise dose after next visit in 6 months.

## 2023-01-07 LAB
T3 SERPL-MCNC: 119 NG/DL
T4 SERPL-MCNC: 9.5 UG/DL
TSH SERPL-ACNC: 1.08 UIU/ML

## 2024-01-05 ENCOUNTER — APPOINTMENT (OUTPATIENT)
Dept: ENDOCRINOLOGY | Facility: CLINIC | Age: 54
End: 2024-01-05

## 2024-01-08 ENCOUNTER — APPOINTMENT (OUTPATIENT)
Dept: ENDOCRINOLOGY | Facility: CLINIC | Age: 54
End: 2024-01-08
Payer: SELF-PAY

## 2024-01-08 ENCOUNTER — TRANSCRIPTION ENCOUNTER (OUTPATIENT)
Age: 54
End: 2024-01-08

## 2024-01-08 DIAGNOSIS — E03.9 HYPOTHYROIDISM, UNSPECIFIED: ICD-10-CM

## 2024-01-08 PROCEDURE — 99215 OFFICE O/P EST HI 40 MIN: CPT | Mod: 95

## 2024-01-08 RX ORDER — LEVOTHYROXINE SODIUM 0.11 MG/1
112 TABLET ORAL
Qty: 90 | Refills: 3 | Status: ACTIVE | COMMUNITY
Start: 1900-01-01 | End: 1900-01-01

## 2024-01-08 NOTE — HISTORY OF PRESENT ILLNESS
[Home] : at home, [unfilled] , at the time of the visit. [Medical Office: ([facility])___] : at the medical office located in  [Verbal consent obtained from patient] : the patient, [unfilled] [FreeTextEntry1] : Jan 08, 2024       - Videochat using [email or web address]   PCP: Dr. Tenisha Madrigal [facility]  phone: [phone] Fax: [phone]  Eyes: Dr. Bass       Derm: Dr. Monteiro - hair loss patch under chin  [facility]  .  CC: Hypothyroid s/p I-131 2009 for Graves' Dis  2013: u/s thyroid - atrophic gland  alopecia areata beard area - also on scalp, treated by Dr. Thania maddox  Hypothyroid post I-131 in 2009 on levothryoxine 112 x 7 1/2 tabs a week. Feels well.  Impression:  Hypothyroidism has been well controlled on current Rx.  Plan:  Time for TFTs -   He would like [facility] and will provide Rx for their.        Jan 06, 2023    in person  PCP: Dr. Tenisha Madrigal [facility]  phone: [phone] Fax: [phone]  Eyes: Dr. Bass     in a few weeks    Derm: Dr. Monteiro - hair loss patch under chin  [facility]  .  CC: Hypothyroid s/p I-131 2009 for Graves' Dis  2013: u/s thyroid - atrophic gland  alopecia areata beard area - also on scalp, treated by Dr. Thania maddox  Hypothyroid post I-131 in 2009 on levothryoxine 112 x 7 1/2 tabs a week. Feels well.  : : Constitutional:  Alert, well nourished, healthy appearance, no acute distress  Eyes:  No proptosis, no stare Thyroid: Pulmonary:  No respiratory distress, no accessory muscle use; normal rate and effort Cardiac:  Normal rate Vascular:  Endocrine:  No stigmata of Cushing's Syndrome Musculoskeletal:  Normal gait, no involuntary movements Neurology:  No tremors Affect/Mood/Psych:  Oriented x 3; normal affect, normal insight/judgement, normal mood  .  Impresion:  Hypothyroidism well controlled.  Plan:  Same Rx. TFTs today f/u to PCP and other members of healthcare team. ROV November.       Apr 29, 2022                               asymtomatic Covid 19 infection   PCP: Dr. Tenisha Madrigal [facility]  phone: [phone] Fax: [phone]  Eyes: Dr. Bass     in a few weeks    Derm: Dr. Monteiro - hair loss patch under chin  ENT at CM: [facility] (did rhinoplasty)  .  CC: Hypothyroid s/p I-131 2009 for Graves' Dis  2013: u/s thyroid - atrophic gland  alopecia areata beard area - also on scalp, treated by Dr. Monteiro    hep  Hypothyroid post I-131 in 2009 on levothryoxine 112 x 7 1/2 tabs a week. Feels well.  50 yo visits for hypothyroidism post I-131 for hyperthyroidism due to Grave's disease in 2009  : : Constitutional:  Alert, well nourished, healthy appearance, no acute distress  Eyes:  No proptosis, no stare Thyroid: minimal tissue Pulmonary:  No respiratory distress, no accessory muscle use; normal rate and effort Cardiac:  Normal rate Vascular:  Endocrine:  No stigmata of Cushing's Syndrome Musculoskeletal:  Normal gait, no involuntary movements Neurology:  No tremors Affect/Mood/Psych:  Oriented x 3; normal affect, normal insight/judgement, normal mood  .  Impression:  Post I-131 hypoothyroidism has been well controleld on levothyroxine 112 x 7 1/2 tabs a week.  Plan:  He will follow up to the other members of his team.  TFTs today and ROV in 8 months.      Feb 05, 2021    [facility]   PCP: Dr. Tenisha Madrigal [facility]  phone: [phone] Fax: [phone]  Eyes: Dr. Bass  Derm: Dr. Monteiro - hair loss patch under chin  ENT at CM: [facility] (did rhinoplasty)  .  CC: Hypothyroid s/p I-131 2009 for Graves' Dis  2013: u/s thyroid - atrophic gland  alopecia areata beard area  hep  Hypothyroid post I-131 in 2009 on levothryoxine 112 x 7 1/2 tabs a week. Feels well.  Plan:  TFTs at [facility].  Call me a few days later. ROV in 10-11 months      Mar 06, 2020  PCP: Dr. Tenisha Madrigal [facility]  phone: [phone] Fax: [phone]  Eyes: Dr. Bass  Derm: Dr. Monteiro - hair loss patch under chin  ENT at CM: [facility] (did rhinoplasty)  .  CC: Hypothyroid s/p I-131 2009 for Graves' Dis  2013: u/s thyroid - atrophic gland  alopecia areata beard area  48 yo still taking levothyroxine 112 mcg x 7 1/2 tabs a week for hypothyroidism post I-131 treatment in 2009. He feels well. Plan:  Updated TFTs today. ROV one year.    July 12, 2019  PCP: Dr. Tenisha Madrigal [facility]  phone: [phone] Fax: [phone]  Eyes: Dr. Bass  Derm: Dr. Monteiro - hair loss patch under chin  ENT at CM: [facility] (did rhinoplasty)  .  CC: Hypothyroid s/p I-131 2009 for Graves' Dis  2013: u/s thyroid - atrophic gland  alopecia areata beard area  He remains on levothyroxine 112 mcg x 7 1/2 tabs a week. He feels well.  Impression:  Hypothyroid post I-131 for hyperthyroidism due to Graves' disease - well replaced. Plan:  Same Rx  - stagger visits here roughtly by 6 months to his PCP.      December 21, 2018   PCP: Dr. Tenisha Madrigal [facility]  phone: [phone] Fax: [phone]  Eyes: Dr. Bass  Derm: Dr. Monteiro - hair loss patch under chin  ENT at CM: [facility] (did rhinoplasty)  .  CC: Hypothyroid s/p I-131 2009 for Graves' Dis  2013: u/s thyroid - atrophic gland  alopecia areata beard area  Currently taking levothyroxine 112 mcg x 7 1/2 tabs a week and feels well.  Plan:   TFTs today and ROV 6 months.       Previous notes from [employer] appended below.    July 20, 2018            .            PCP: Dr. Tenisha Madrigal [facility]             phone: [phone] Fax: [phone]             Eyes: Dr. Bass             Derm: Dr. Monteiro - hair loss patch under chin             ENT at : [facility] (did rhinoplasty)            .            CC: Hypothyroid s/p I-131 2009 for Graves' Dis             2013: u/s thyroid - atrophic gland             alopecia areata beard area            .            Remains on 7 1/2 tabs of LT4 112 mcg daily.             .            Has had some fluctuations of TSH.            January 12 at [facility] 7.38 and            Jan 22 at [facility] TSH 3.45            .            Subsequent HA.            Has followed up to Dr. Gregory.            Feeling better.            May have some sinus issues.            .            Impression: Hypothyroid s/p I-131 for Graves' disease doing well on current dose of levothyroxine 112 mcg x 7 1/2 tabs a week.            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             Does not appear that TFTs are related to HA.             .            Plan: Updated labs today and ROV end of November.            .            ==            .            January 22, 2018            .            .            PCP: Dr. Tenisha Madrigal [facility]             phone: [phone] Fax: [phone]             Eyes: Dr. Bass             Derm: Dr. Monteiro - hair loss patch under chin            .            CC: Hypothyroid s/p I-131 2009 for Graves' Dis             2013: u/s thyroid - atrophic gland            .            Recent labs kindly provided by Dr. Tenisha Mane from             1/12/2018 included            TSH 7.88            .            Notes 2 BM/d            1 + urine             .            Imp: Taking L-T4 112 x 7 1/2 tabs a week = 119            .            Plan: Updated labs            May need 125            ROV 4 months            .            ==            .            June 16, 2017            .            PCP: Dr. Tenisha Madrigal [facility]             phone: [phone] Fax: [phone]             Eyes: Dr. Bass             Derm: Dr. Thania Madrigal hair loss patch under chin            .            CC: Hypothyroid s/p I-131 2009             2013: u/s thyroid - atrophic gland            .            Taking levothyroxine 112 mcg x 7 1/2 tabs a week. (~120 mcg avg)            Feels well.            No symptoms            .            Impression: S/P I131 treatment of hyperthyroidism in 2009, doing well on current dose of levothyroxine             .            Plan: History reviewed with Mr. Raya.            Guidelines reviewed.             Updated labs today and ROV March 2018.            Call here in 10-14 days.            .            ==            .            May 6, 2016            .            PCP: Dr. Leatha Fischer -> Dr. Tenisha Mane - [place]             phone: [phone] Fax: [phone]             Eyes: Dr. Bass            .            CC: Hypothyroid s/p I-131 2009            .            Note from May 2015 appended below.            At that time, labs ([facility]) included:            .            TSH 0.41,  (<140%) on            levothyroxine 112 mcg x 7 1/2 pills a week.             .            1/2009: Hematuria - Dr. Eisenberg - negative U/S.            .            .            11/9/2009: ultrasound abdomen: "No suspicious finding to account for elevated enzymes..." as read by Dr. Long            .            11/18/2009: I-131 18 mCi to treat hyperthyroidism. after he developed            LFT elevation on methimazole.            .            2/10/2012: Seen at [facility] for chest pain. TSH 3.94            .            4/12/2013: Ultrasound thyroid: Heterogeneous atrophic thyroid..R lobe 2.8 cm, L lobe 1.9 cm (Dr. Almanza).             .            5/3/2016 ([facility] via [facility]) included:            TSH 1.65 (on levothyroxine 112 mcg x 7 1/2 pills/week)            T4 10.8            T3 102            .            Impression: Hypothyroidism due to I131 treatment of Graves' disease after LFT elevation on methimazole currently well controlled on levothyroxine 112 mcg x 7 1/2 pills a week. He feels well. Notes no symptoms. Eyes being followed by Dr. Bass.            .            Plan: Same Rx. ROV within one year, sooner if needed.             .            ==            .            May 15, 2015            .            PCP: Dr. Leatha Fischer            CC: Hypothyroid s/p I-131 2009            .            Note from last year appended below.            Recent TSH within normal limits on levothyroxine 112 mcg            7 1/2 pills/week.            .            Impression: Doing well.            .            Plan: F/U one year.            .            ==            April 25, 2014            PCP: Dr. Leatha Fischer            CC: Hypothyroid s/p I-131            .            2009: I-131            2014 TSH normal on L-thyroxine 112 7 1/2 pills a week             Impression: Feels well. Previous sonogram thyroid bed unremarkable. TFTs in good range.            .            Plan: Same Rx.            F/U Dr. Fischer            .            =======            October 18, 2013 April 19, 2013            CC: Hypothryoid s/p I-131 for Graves dis            .            PCP: Dr. Fischer            .            Feels well on L-thyroxine 112 mcg daily.            TSH still slgihtly elevated, as is TSI            Thyroid sonogram shows only atrophic gland.            .            Will continue same Rx for now, possibly raise dose after next visit in 6 months.

## 2024-01-09 ENCOUNTER — LABORATORY RESULT (OUTPATIENT)
Age: 54
End: 2024-01-09

## 2024-10-16 ENCOUNTER — NON-APPOINTMENT (OUTPATIENT)
Age: 54
End: 2024-10-16

## 2024-12-23 ENCOUNTER — RX RENEWAL (OUTPATIENT)
Age: 54
End: 2024-12-23

## 2025-01-10 ENCOUNTER — APPOINTMENT (OUTPATIENT)
Dept: ENDOCRINOLOGY | Facility: CLINIC | Age: 55
End: 2025-01-10
Payer: COMMERCIAL

## 2025-01-10 VITALS
HEART RATE: 71 BPM | OXYGEN SATURATION: 98 % | BODY MASS INDEX: 25.47 KG/M2 | HEIGHT: 72 IN | DIASTOLIC BLOOD PRESSURE: 74 MMHG | SYSTOLIC BLOOD PRESSURE: 116 MMHG | WEIGHT: 188 LBS

## 2025-01-10 DIAGNOSIS — E03.9 HYPOTHYROIDISM, UNSPECIFIED: ICD-10-CM

## 2025-01-10 LAB
ANION GAP SERPL CALC-SCNC: 12 MMOL/L
BUN SERPL-MCNC: 18 MG/DL
CALCIUM SERPL-MCNC: 9.8 MG/DL
CHLORIDE SERPL-SCNC: 102 MMOL/L
CO2 SERPL-SCNC: 26 MMOL/L
CREAT SERPL-MCNC: 1.14 MG/DL
EGFR: 77 ML/MIN/1.73M2
GLUCOSE SERPL-MCNC: 96 MG/DL
POTASSIUM SERPL-SCNC: 4.3 MMOL/L
SODIUM SERPL-SCNC: 140 MMOL/L
T4 SERPL-MCNC: 10.8 UG/DL
TSH SERPL-ACNC: 6.79 UIU/ML

## 2025-01-10 PROCEDURE — 36415 COLL VENOUS BLD VENIPUNCTURE: CPT

## 2025-01-10 PROCEDURE — 99214 OFFICE O/P EST MOD 30 MIN: CPT

## 2025-05-09 RX ORDER — LEVOTHYROXINE SODIUM 0.12 MG/1
125 TABLET ORAL
Qty: 90 | Refills: 3 | Status: ACTIVE | COMMUNITY
Start: 2025-05-09 | End: 1900-01-01